# Patient Record
Sex: MALE | Race: WHITE | Employment: FULL TIME | URBAN - METROPOLITAN AREA
[De-identification: names, ages, dates, MRNs, and addresses within clinical notes are randomized per-mention and may not be internally consistent; named-entity substitution may affect disease eponyms.]

---

## 2022-07-24 ENCOUNTER — HOSPITAL ENCOUNTER (EMERGENCY)
Age: 47
Discharge: HOME | End: 2022-07-24
Payer: COMMERCIAL

## 2022-07-24 ENCOUNTER — APPOINTMENT (OUTPATIENT)
Dept: GENERAL RADIOLOGY | Age: 47
End: 2022-07-24
Payer: COMMERCIAL

## 2022-07-24 VITALS
WEIGHT: 205 LBS | SYSTOLIC BLOOD PRESSURE: 142 MMHG | HEIGHT: 73 IN | DIASTOLIC BLOOD PRESSURE: 82 MMHG | OXYGEN SATURATION: 98 % | TEMPERATURE: 97.3 F | HEART RATE: 96 BPM | RESPIRATION RATE: 20 BRPM | BODY MASS INDEX: 27.17 KG/M2

## 2022-07-24 DIAGNOSIS — S93.601A FOOT SPRAIN, RIGHT, INITIAL ENCOUNTER: Primary | ICD-10-CM

## 2022-07-24 PROCEDURE — 99211 OFF/OP EST MAY X REQ PHY/QHP: CPT

## 2022-07-24 PROCEDURE — 73630 X-RAY EXAM OF FOOT: CPT

## 2022-07-24 ASSESSMENT — PAIN - FUNCTIONAL ASSESSMENT: PAIN_FUNCTIONAL_ASSESSMENT: NONE - DENIES PAIN

## 2022-07-24 NOTE — Clinical Note
[address]ory Way was seen and treated in our emergency department on 7/24/2022. He may return to work on 07/26/2022. If you have any questions or concerns, please don't hesitate to call.       Marbin Pennington PA-C

## 2022-07-24 NOTE — ED PROVIDER NOTES
[facility]  Department of Emergency Medicine  UC Encounter Note  22   12:53 PM EDT      NAME: Cesia Peña  :  1975  MRN:  [id number]    Chief Complaint: Wound Check (Happened yesterday while playing soccer  hurt right gr toe)      This is a 75-year-old male the presents to urgent care with his mother. Mother is concerned because the patient was playing sports yesterday and he injured his right foot near the right great toe. There is swelling there. There is been no numbness. Denies other injury. No head or neck pain. On first contact patient he appears to be in no acute distress. Review of Systems  Pertinent positives and negatives are stated within HPI, all other systems reviewed and are negative. Physical Exam  Vitals and nursing note reviewed. Constitutional:       Appearance: He is well-developed. HENT:      Head: Normocephalic and atraumatic. Jaw: No trismus. Right Ear: Hearing and external ear normal.      Left Ear: Hearing and external ear normal.      Nose: Nose normal.      Right Sinus: No maxillary sinus tenderness or frontal sinus tenderness. Left Sinus: No maxillary sinus tenderness or frontal sinus tenderness. Mouth/Throat:      Pharynx: Uvula midline. No uvula swelling. Eyes:      General: Lids are normal.      Conjunctiva/sclera: Conjunctivae normal.      Pupils: Pupils are equal, round, and reactive to light. Cardiovascular:      Rate and Rhythm: Normal rate and regular rhythm. Heart sounds: Normal heart sounds. No murmur heard. Pulmonary:      Effort: Pulmonary effort is normal.      Breath sounds: Normal breath sounds. Abdominal:      General: Bowel sounds are normal.      Palpations: Abdomen is soft. Abdomen is not rigid. Tenderness: There is no abdominal tenderness. There is no guarding or rebound. Musculoskeletal:      Cervical back: Normal range of motion and neck supple.       Comments: Patient has tenderness especially to the proximal phalanx of the right great toe there is some MTP joint tenderness to the right great toe area and also some first metatarsal distal tenderness as well. There is some mild swelling but no bruising. Has palpable pedal pulses. No open area. The nail appears to be normal.  The right ankle is nontender. There is no Achilles tendon defect. Skin:     General: Skin is warm and dry. Findings: No abrasion or rash. Neurological:      General: No focal deficit present. Mental Status: He is alert and oriented to person, place, and time. GCS: GCS eye subscore is 4. GCS verbal subscore is 5. GCS motor subscore is 6. Cranial Nerves: No cranial nerve deficit. Sensory: No sensory deficit. Coordination: Coordination normal.      Gait: Gait normal.       Procedures    MDM  Number of Diagnoses or Management Options  Foot sprain, right, initial encounter  Diagnosis management comments: Patient in no acute distress. X-ray was reviewed. Treat for foot sprain. Instructions given. Note for work shop given. Gait is steady. --------------------------------------------- PAST HISTORY ---------------------------------------------  Past Medical History:  has no past medical history on file. Past Surgical History:  has no past surgical history on file. Social History:  reports that he has never smoked. He has never used smokeless tobacco. He reports that he does not use drugs. Family History: family history is not on file. The patients home medications have been reviewed. Allergies: Keflex [cephalexin]    -------------------------------------------------- RESULTS -------------------------------------------------  No results found for this visit on 07/24/22. XR FOOT RIGHT (MIN 3 VIEWS)   Final Result   No acute bony abnormality. Minimal degenerative changes seen within the 1st   metatarsophalangeal joint with calcaneal spurring. ------------------------- NURSING NOTES AND VITALS REVIEWED ---------------------------   The nursing notes within the ED encounter and vital signs as below have been reviewed. BP (!) 142/82   Pulse 96   Temp 97.3 °F (36.3 °C) (Infrared)   Resp 20   Ht 6' 1\" (1.854 m)   Wt 205 lb (93 kg)   SpO2 98%   BMI 27.05 kg/m²   Oxygen Saturation Interpretation: Normal      ------------------------------------------ PROGRESS NOTES ------------------------------------------   I have spoken with the patient and mother and discussed todays results, in addition to providing specific details for the plan of care and counseling regarding the diagnosis and prognosis. Their questions are answered at this time and they are agreeable with the plan.      --------------------------------- ADDITIONAL PROVIDER NOTES ---------------------------------     This patient is stable for discharge. I have shared the specific conditions for return, as well as the importance of follow-up. * NOTE: This report was transcribed using voice recognition software. Every effort was made to ensure accuracy; however, inadvertent computerized transcription errors may be present.    --------------------------------- IMPRESSION AND DISPOSITION ---------------------------------    IMPRESSION  1.  Foot sprain, right, initial encounter        DISPOSITION  Disposition: Discharge to home  Patient condition is good       Jay Ragland PA-C  07/24/22 1400

## 2022-12-12 ENCOUNTER — HOSPITAL ENCOUNTER (EMERGENCY)
Age: 47
Discharge: HOME | End: 2022-12-12
Payer: COMMERCIAL

## 2022-12-12 VITALS
RESPIRATION RATE: 20 BRPM | HEART RATE: 101 BPM | BODY MASS INDEX: 28.36 KG/M2 | TEMPERATURE: 98.7 F | SYSTOLIC BLOOD PRESSURE: 147 MMHG | WEIGHT: 214 LBS | HEIGHT: 73 IN | DIASTOLIC BLOOD PRESSURE: 94 MMHG | OXYGEN SATURATION: 99 %

## 2022-12-12 DIAGNOSIS — M10.9 ACUTE GOUT INVOLVING TOE OF RIGHT FOOT, UNSPECIFIED CAUSE: Primary | ICD-10-CM

## 2022-12-12 PROCEDURE — 99211 OFF/OP EST MAY X REQ PHY/QHP: CPT

## 2022-12-12 RX ORDER — PREDNISONE 20 MG/1
60 TABLET ORAL DAILY
Qty: 15 TABLET | Refills: 0 | Status: SHIPPED | OUTPATIENT
Start: 2022-12-12 | End: 2022-12-17

## 2022-12-12 ASSESSMENT — PAIN DESCRIPTION - ORIENTATION: ORIENTATION: RIGHT

## 2022-12-12 ASSESSMENT — PAIN DESCRIPTION - LOCATION: LOCATION: FOOT;TOE (COMMENT WHICH ONE)

## 2022-12-12 ASSESSMENT — PAIN SCALES - GENERAL: PAINLEVEL_OUTOF10: 8

## 2022-12-12 ASSESSMENT — PAIN DESCRIPTION - PAIN TYPE: TYPE: ACUTE PAIN

## 2022-12-12 ASSESSMENT — PAIN DESCRIPTION - DESCRIPTORS: DESCRIPTORS: SORE

## 2022-12-12 ASSESSMENT — PAIN DESCRIPTION - FREQUENCY: FREQUENCY: CONTINUOUS

## 2022-12-12 ASSESSMENT — PAIN DESCRIPTION - ONSET: ONSET: GRADUAL

## 2022-12-12 ASSESSMENT — PAIN - FUNCTIONAL ASSESSMENT: PAIN_FUNCTIONAL_ASSESSMENT: 0-10

## 2022-12-12 NOTE — ED PROVIDER NOTES
HPI:  12/12/22, Time: 12:50 PM CANDACE Galvez is a 52 y.o. male presenting to the ED for right great toe pain, beginning 1 day ago. The complaint has been persistent, moderate in severity, and worsened by palpation of affected area. Patient and his mother provide history at urgent care today. Reports that the pain is located to the MTP joint of the right great toe and does not radiate. Does not recall any particular injury to this area but may have bumped it on something. Reports that it hurts particularly worse when he wears his steel toed boots. Mother did give the patient tart cherry tablets prior to arrival which did help to improve the symptoms. He has never had anything like this before. Afebrile without recent travel or sick contacts. No numbness or tingling to the area. Patient and mother deny all other symptoms at this time. Review of Systems:   A complete review of systems was performed and pertinent positives and negatives are stated within HPI, all other systems reviewed and are negative.          --------------------------------------------- PAST HISTORY ---------------------------------------------  Past Medical History:  has a past medical history of Delayed growth and development. Past Surgical History:  has a past surgical history that includes hernia repair. Social History:  reports that he has never smoked. He has never used smokeless tobacco. He reports that he does not use drugs. Family History: family history is not on file. The patients home medications have been reviewed. Allergies: Keflex [cephalexin]    -------------------------------------------------- RESULTS -------------------------------------------------  All laboratory and radiology results have been personally reviewed by myself   LABS:  No results found for this visit on 12/12/22.     RADIOLOGY:  Interpreted by Radiologist.  No orders to display       ------------------------- NURSING NOTES AND VITALS REVIEWED ---------------------------   The nursing notes within the ED encounter and vital signs as below have been reviewed. BP (!) 147/94   Pulse (!) 101   Temp 98.7 °F (37.1 °C) (Infrared)   Resp 20   Ht 6' 1\" (1.854 m)   Wt 214 lb (97.1 kg)   SpO2 99%   BMI 28.23 kg/m²   Oxygen Saturation Interpretation: Normal      ---------------------------------------------------PHYSICAL EXAM--------------------------------------      Constitutional/General: Alert and oriented x3, well appearing, non toxic in NAD  Head: Normocephalic and atraumatic  Eyes: PERRL, EOMI  Mouth: Oropharynx clear, handling secretions, no trismus  Neck: Supple, full ROM,   Extremities: Moves all extremities x 4. Warm and well perfused. Swelling, erythema, tenderness to palpation noted to the first MTP joint of the right foot. Range of motion of all the digits of the toes of the right foot as well as the right ankle within normal limits. Skin: warm and dry without rash  Neurologic: GCS 15,  Psych: Normal Affect      ------------------------------ ED COURSE/MEDICAL DECISION MAKING----------------------  Medications - No data to display      ED COURSE:       Medical Decision Making:    Patient is a 51-year-old male presenting to urgent care today with pain to the right great toe. Physical exam findings consistent with gout. At this time we will plan on outpatient management with steroids as he is nontoxic-appearing, afebrile, no acute distress. Advised follow-up with PCP for recheck and return the emergency department with any new or worsening symptoms. Patient and mother voiced understanding and are agreeable to the above treatment plan. Counseling: The emergency provider has spoken with the family member patient and mother and discussed todays results, in addition to providing specific details for the plan of care and counseling regarding the diagnosis and prognosis.   Questions are answered at this time and they are agreeable with the plan.      --------------------------------- IMPRESSION AND DISPOSITION ---------------------------------    IMPRESSION  1. Acute gout involving toe of right foot, unspecified cause        DISPOSITION  Disposition: Discharge to home  Patient condition is stable      NOTE: This report was transcribed using voice recognition software.  Every effort was made to ensure accuracy; however, inadvertent computerized transcription errors may be present        [place], [place]  12/12/22 0686